# Patient Record
(demographics unavailable — no encounter records)

---

## 2024-12-10 NOTE — PLAN
[FreeTextEntry1] : I  #Bell's Palsy s/p prednisone and valtrex -advised continue use of artificial tears -Counseled pt can take up to 6mo-1yr for resolution of facial paralysis -f/u 2 weeks. if no improvement, consider PT  #Anxiety FEROZ 7: 7, pt worried and anxious due to father's recent kidney transplant -continue to monitor  #Immunization -flu vaccine today  RTC for CPE  *case d/w Dr. Baum

## 2024-12-10 NOTE — HISTORY OF PRESENT ILLNESS
[FreeTextEntry8] : 23yo presents as new pt to establish care and f/u after ED visit for Bell's palsy. Pt was in ED on 11/25/24 for right-sided facial paralysis (Lid lag, loss of strength with facial expressions affecting upper and lower face). Pt was discharged on valacyclovir 1g TID for 7 days, prednisone 40 mg qd x5days, and Refresh Tears q1-2hrs.   Today pt states completed course of prednisone 40mg x5days and valacyclovir- states feels the same and no improvement. Pt c/o unable to move lip/nose, decrease hearing, not able to close eyes- all on the right side. Blurriness of R eye, eye gets tired if doesn't use the eyedrop.  Pt states anxious as father recently with kidney transplant and is worried about it.

## 2024-12-10 NOTE — REVIEW OF SYSTEMS
[Dryness] : dryness [Vision Problems] : vision problems [Hearing Loss] : hearing loss [Negative] : Psychiatric [Redness] : no redness [Itching] : no itching [Earache] : no earache [Nasal Discharge] : no nasal discharge [Sore Throat] : no sore throat [Hoarseness] : no hoarseness

## 2024-12-10 NOTE — PHYSICAL EXAM
[Normal Sclera/Conjunctiva] : normal sclera/conjunctiva [EOMI] : extraocular movements intact [Normal Outer Ear/Nose] : the outer ears and nose were normal in appearance [Normal Oropharynx] : the oropharynx was normal [No Lymphadenopathy] : no lymphadenopathy [Thyroid Normal, No Nodules] : the thyroid was normal and there were no nodules present [Normal] : no respiratory distress, lungs were clear to auscultation bilaterally and no accessory muscle use [Normal Rate] : normal rate  [Regular Rhythm] : with a regular rhythm [Soft] : abdomen soft [Non Tender] : non-tender [Non-distended] : non-distended [Normal Affect] : the affect was normal [Normal Insight/Judgement] : insight and judgment were intact [de-identified] : R-side eyelid unable to close fully. [de-identified] : Decreased sensation on R-side of face. Facial droop of R-side of face and unable to raise right eyebrow.

## 2024-12-10 NOTE — INTERPRETER SERVICES
[Interpreters_IDNumber] : [id number] [Interpreters_FullName] : Benny [TWNoteComboBox1] : [place]

## 2024-12-10 NOTE — PHYSICAL EXAM
[Normal Sclera/Conjunctiva] : normal sclera/conjunctiva [EOMI] : extraocular movements intact [Normal Outer Ear/Nose] : the outer ears and nose were normal in appearance [Normal Oropharynx] : the oropharynx was normal [No Lymphadenopathy] : no lymphadenopathy [Thyroid Normal, No Nodules] : the thyroid was normal and there were no nodules present [Normal] : no respiratory distress, lungs were clear to auscultation bilaterally and no accessory muscle use [Normal Rate] : normal rate  [Regular Rhythm] : with a regular rhythm [Soft] : abdomen soft [Non Tender] : non-tender [Non-distended] : non-distended [Normal Affect] : the affect was normal [Normal Insight/Judgement] : insight and judgment were intact [de-identified] : R-side eyelid unable to close fully. [de-identified] : Decreased sensation on R-side of face. Facial droop of R-side of face and unable to raise right eyebrow.

## 2024-12-10 NOTE — HISTORY OF PRESENT ILLNESS
[FreeTextEntry8] : 25yo presents as new pt to establish care and f/u after ED visit for Bell's palsy. Pt was in ED on 11/25/24 for right-sided facial paralysis (Lid lag, loss of strength with facial expressions affecting upper and lower face). Pt was discharged on valacyclovir 1g TID for 7 days, prednisone 40 mg qd x5days, and Refresh Tears q1-2hrs.   Today pt states completed course of prednisone 40mg x5days and valacyclovir- states feels the same and no improvement. Pt c/o unable to move lip/nose, decrease hearing, not able to close eyes- all on the right side. Blurriness of R eye, eye gets tired if doesn't use the eyedrop.  Pt states anxious as father recently with kidney transplant and is worried about it.

## 2024-12-31 NOTE — HEALTH RISK ASSESSMENT
[Good] : ~his/her~  mood as  good [No] : No [No falls in past year] : Patient reported no falls in the past year [PHQ-2 Negative - No further assessment needed] : PHQ-2 Negative - No further assessment needed [Never] : Never [HIV Test offered] : HIV Test offered [Hepatitis C test offered] : Hepatitis C test offered [With Family] : lives with family [Employed] : employed [Feels Safe at Home] : Feels safe at home [Fully functional (bathing, dressing, toileting, transferring, walking, feeding)] : Fully functional (bathing, dressing, toileting, transferring, walking, feeding) [Fully functional (using the telephone, shopping, preparing meals, housekeeping, doing laundry, using] : Fully functional and needs no help or supervision to perform IADLs (using the telephone, shopping, preparing meals, housekeeping, doing laundry, using transportation, managing medications and managing finances) [Single] : single [[id number]] : 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

## 2024-12-31 NOTE — PLAN
[FreeTextEntry1] : I   #HCM  #Overweight  -CBC, CMP, A1c, lipid profile  -Hep C and HIV ordered  -Pt states had Tdap vaccine 4yrs ago -UTD flu -Encouraged lifestyle modification. Advised at least 30min aerobic exercises/day x5days per week -Encouraged diet consisting of increase in whole grains, assorted vegetables, fruits, lean meats, fish, nuts, and avoidance of fatty, fried, refined carbohydrates, and sugary beverages  #Bell's Palsy  s/p prednisone and valtrex  -resolved

## 2024-12-31 NOTE — PHYSICAL EXAM
[Normal Sclera/Conjunctiva] : normal sclera/conjunctiva [EOMI] : extraocular movements intact [Normal] : no respiratory distress, lungs were clear to auscultation bilaterally and no accessory muscle use [Normal Rate] : normal rate  [Regular Rhythm] : with a regular rhythm [Soft] : abdomen soft [Non Tender] : non-tender [Non-distended] : non-distended [Normal Posterior Cervical Nodes] : no posterior cervical lymphadenopathy [Normal Anterior Cervical Nodes] : no anterior cervical lymphadenopathy [Grossly Normal Strength/Tone] : grossly normal strength/tone [No Focal Deficits] : no focal deficits [Normal Gait] : normal gait [Normal Affect] : the affect was normal [Normal Insight/Judgement] : insight and judgment were intact

## 2024-12-31 NOTE — HISTORY OF PRESENT ILLNESS
[FreeTextEntry1] : CPE [de-identified] :  23yo with hx bell's palsy presents for CPE. Last visit 12/9 for ED f/u after Bell's palsy (right-sided facial paralysis- Lid lag, loss of strength with facial expressions affecting upper and lower face), s/p valacyclovir and prednisone 40mg. Today, pt states Bell's palsy resolved. No acute concerns or complaints today. Patient denies fevers, chills, headaches, changes in vision, chest pain, palpitations, shortness of breath, abdominal pain, N/V, cough, dizziness, or weakness.

## 2024-12-31 NOTE — INTERPRETER SERVICES
[Interpreters_IDNumber] : [id number] [Interpreters_FullName] : [place] [TWNoteComboBox1] : Citizen of [place]

## 2024-12-31 NOTE — HEALTH RISK ASSESSMENT
[Good] : ~his/her~  mood as  good [No] : No [No falls in past year] : Patient reported no falls in the past year [PHQ-2 Negative - No further assessment needed] : PHQ-2 Negative - No further assessment needed [Never] : Never [HIV Test offered] : HIV Test offered [Hepatitis C test offered] : Hepatitis C test offered [With Family] : lives with family [Employed] : employed [Feels Safe at Home] : Feels safe at home [Fully functional (bathing, dressing, toileting, transferring, walking, feeding)] : Fully functional (bathing, dressing, toileting, transferring, walking, feeding) [Fully functional (using the telephone, shopping, preparing meals, housekeeping, doing laundry, using] : Fully functional and needs no help or supervision to perform IADLs (using the telephone, shopping, preparing meals, housekeeping, doing laundry, using transportation, managing medications and managing finances) [Single] : single [[id number]] : 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

## 2024-12-31 NOTE — HISTORY OF PRESENT ILLNESS
[FreeTextEntry1] : CPE [de-identified] :  25yo with hx bell's palsy presents for CPE. Last visit 12/9 for ED f/u after Bell's palsy (right-sided facial paralysis- Lid lag, loss of strength with facial expressions affecting upper and lower face), s/p valacyclovir and prednisone 40mg. Today, pt states Bell's palsy resolved. No acute concerns or complaints today. Patient denies fevers, chills, headaches, changes in vision, chest pain, palpitations, shortness of breath, abdominal pain, N/V, cough, dizziness, or weakness.

## 2025-01-22 NOTE — PHYSICAL EXAM
[Normal Sclera/Conjunctiva] : normal sclera/conjunctiva [EOMI] : extraocular movements intact [Normal] : no respiratory distress, lungs were clear to auscultation bilaterally and no accessory muscle use [Normal Rate] : normal rate  [Regular Rhythm] : with a regular rhythm [Soft] : abdomen soft [Non Tender] : non-tender [Non-distended] : non-distended [Normal Gait] : normal gait [Normal Affect] : the affect was normal [Normal Insight/Judgement] : insight and judgment were intact

## 2025-01-30 NOTE — HISTORY OF PRESENT ILLNESS
[FreeTextEntry1] : f/u HLD [de-identified] : 26yo with hx bell's palsy (resolved) presents for f/u HLD. Last visit 12/30/24 for CPE. Discussed labs . CBC, TSH wnl.  No acute complaints or concerns today. Pt states does not exercise.

## 2025-01-30 NOTE — PLAN
[FreeTextEntry1] : I  #HLD  [id number] - No indication for statins use at this time - Encouraged plant-based diet consisting of increase in whole grains, assorted vegetables and fruits, and fiber-rich foods. Avoid fatty/fried/fast foods, refined carbohydrates, and sugary beverages (soda, juice). Encourage home-cooked meals where one can monitor ingredients - Advised at least 30min aerobic exercises/day x5days per week - RTC as needed or in 1 year for CPE  *Case d/w Dr. Baum

## 2025-01-30 NOTE — PLAN
[FreeTextEntry1] : I  #HLD  [id number] - No indication for statins use at this time - Encouraged plant-based diet consisting of increase in whole grains, assorted vegetables and fruits, and fiber-rich foods. Avoid fatty/fried/fast foods, refined carbohydrates, and sugary beverages (soda, juice). Encourage home-cooked meals where one can monitor ingredients - Advised at least 30min aerobic exercises/day x5days per week - RTC as needed or in 1 year for CPE  *Case d/w Dr. Baum

## 2025-01-30 NOTE — HISTORY OF PRESENT ILLNESS
[FreeTextEntry1] : f/u HLD [de-identified] : 24yo with hx bell's palsy (resolved) presents for f/u HLD. Last visit 12/30/24 for CPE. Discussed labs . CBC, TSH wnl.  No acute complaints or concerns today. Pt states does not exercise.

## 2025-07-12 NOTE — PHYSICAL EXAM
[No Acute Distress] : no acute distress [Well-Appearing] : well-appearing [Normal Sclera/Conjunctiva] : normal sclera/conjunctiva [EOMI] : extraocular movements intact [Normal Outer Ear/Nose] : the outer ears and nose were normal in appearance [Normal Oropharynx] : the oropharynx was normal [No Lymphadenopathy] : no lymphadenopathy [Supple] : supple [No Edema] : there was no peripheral edema [No Extremity Clubbing/Cyanosis] : no extremity clubbing/cyanosis [Soft] : abdomen soft [No Masses] : no abdominal mass palpated [No HSM] : no HSM [Normal Bowel Sounds] : normal bowel sounds [No CVA Tenderness] : no CVA  tenderness [No Spinal Tenderness] : no spinal tenderness [No Joint Swelling] : no joint swelling [Normal] : affect was normal and insight and judgment were intact [de-identified] : 4 abdominal incisions clean/dry/intact with steri-strips in place; mild abd tenderness right-sided

## 2025-07-12 NOTE — HISTORY OF PRESENT ILLNESS
[Post-hospitalization from ___ Hospital] : Post-hospitalization from [unfilled] Hospital [Admitted on: ___] : The patient was admitted on [unfilled] [Discharged on ___] : discharged on [unfilled] [Discharge Summary] : discharge summary [Pertinent Labs] : pertinent labs [Radiology Findings] : radiology findings [Discharge Med List] : discharge medication list [Med Reconciliation] : medication reconciliation has been completed [FreeTextEntry2] : 24 yo M with PMHx of Bell's palsy presents with severe RUQ pain since 6pm. Patient describes pain as a "squeezing" type pain that requires him to double over due to severity. Pain does not radiate. Improved s/p morphine in ED. Associated with nausea/vomiting and 1 episode of nonbloody diarrhea. Denies fever, chills.  ED Course: Vitals: BP: 136/73, HR: 80, Temp: 98.1, RR: 15, SpO2: 96% on RA Labs:  WBC 15.27 CTAP: Questionable mild gallbladder wall thickening. US: Cholelithiasis with questionable cholecystitis, depressed sonographic Lee sign. Gallbladder polyps measuring 0.9-0.7 cm respectively. Zofran x1, morphine 4mg x1, NS bolus x1 Admitted for further management of acute cholecystitis. Hospital Course Summary: Patient admitted for RUQ pain. WBC elevated to 15.27. Patient received zosyn, zofran, morphine, IV fluids. Patient WBC improved to 8.61. Ultrasound findings showed Cholelithiasis with sonographic Lee sign, Gallbladder polyps, consistent with cholecystitis. Surgery was consulted and performed laparoscopic cholecystectomy on 7/7. No complications. Patient is tolerating diet after surgery and is stable for discharge. Will need to follow up with PCP and surgery.  Today patient feels generally well, reports some abdominal pain when getting up from lying position as well as soft stool after eating. Denies fever, chills, urinary sxs, nausea/vomiting. Has stopped taking pain medications.

## 2025-07-12 NOTE — PHYSICAL EXAM
[No Acute Distress] : no acute distress [Well-Appearing] : well-appearing [Normal Sclera/Conjunctiva] : normal sclera/conjunctiva [EOMI] : extraocular movements intact [Normal Outer Ear/Nose] : the outer ears and nose were normal in appearance [Normal Oropharynx] : the oropharynx was normal [No Lymphadenopathy] : no lymphadenopathy [Supple] : supple [No Edema] : there was no peripheral edema [No Extremity Clubbing/Cyanosis] : no extremity clubbing/cyanosis [Soft] : abdomen soft [No Masses] : no abdominal mass palpated [No HSM] : no HSM [Normal Bowel Sounds] : normal bowel sounds [No CVA Tenderness] : no CVA  tenderness [No Spinal Tenderness] : no spinal tenderness [No Joint Swelling] : no joint swelling [Normal] : affect was normal and insight and judgment were intact [de-identified] : 4 abdominal incisions clean/dry/intact with steri-strips in place; mild abd tenderness right-sided

## 2025-07-19 NOTE — ASSESSMENT
[FreeTextEntry1] : likely normal post op pain/discomfort if he has continued discomfort I will order CMP

## 2025-07-19 NOTE — HISTORY OF PRESENT ILLNESS
[de-identified] : used [facility](#[id number]) s/p laparoscopic cholecystectomy July 7, 2025 c/o minimal pain ruq, some nausea otherwise doing well

## 2025-07-19 NOTE — PHYSICAL EXAM
[Calm] : calm [de-identified] : looks well [de-identified] : sclera anicteric [de-identified] : trocar sites clean and dry no hernias on multiple valsalva maneuvers soft and non distended and non tender